# Patient Record
Sex: FEMALE | Race: WHITE | HISPANIC OR LATINO | Employment: UNEMPLOYED | URBAN - METROPOLITAN AREA
[De-identification: names, ages, dates, MRNs, and addresses within clinical notes are randomized per-mention and may not be internally consistent; named-entity substitution may affect disease eponyms.]

---

## 2020-06-25 ENCOUNTER — TELEPHONE (OUTPATIENT)
Dept: INTERNAL MEDICINE CLINIC | Facility: CLINIC | Age: 55
End: 2020-06-25

## 2020-07-07 ENCOUNTER — TELEMEDICINE (OUTPATIENT)
Dept: INTERNAL MEDICINE CLINIC | Facility: CLINIC | Age: 55
End: 2020-07-07

## 2020-07-07 VITALS — HEIGHT: 64 IN | BODY MASS INDEX: 35.85 KG/M2 | WEIGHT: 210 LBS

## 2020-07-07 DIAGNOSIS — E11.9 CONTROLLED TYPE 2 DIABETES MELLITUS WITHOUT COMPLICATION, WITHOUT LONG-TERM CURRENT USE OF INSULIN (HCC): Primary | ICD-10-CM

## 2020-07-07 DIAGNOSIS — E04.2 MULTIPLE THYROID NODULES: ICD-10-CM

## 2020-07-07 DIAGNOSIS — I10 ESSENTIAL HYPERTENSION: ICD-10-CM

## 2020-07-07 DIAGNOSIS — R22.1 LOCALIZED SWELLING, MASS AND LUMP, NECK: ICD-10-CM

## 2020-07-07 DIAGNOSIS — Z11.4 SCREENING FOR HIV (HUMAN IMMUNODEFICIENCY VIRUS): ICD-10-CM

## 2020-07-07 DIAGNOSIS — E01.0 THYROMEGALY: ICD-10-CM

## 2020-07-07 PROBLEM — M77.8 ENTHESOPATHY OF FOOT: Status: ACTIVE | Noted: 2019-06-23

## 2020-07-07 PROBLEM — F43.21 ADJUSTMENT DISORDER WITH DEPRESSED MOOD: Status: ACTIVE | Noted: 2019-07-11

## 2020-07-07 PROBLEM — M21.6X2 EQUINUS DEFORMITY OF BOTH FEET: Status: ACTIVE | Noted: 2017-10-04

## 2020-07-07 PROBLEM — G89.29 CHRONIC PAIN OF RIGHT KNEE: Status: ACTIVE | Noted: 2019-07-11

## 2020-07-07 PROBLEM — M25.561 CHRONIC PAIN OF RIGHT KNEE: Status: ACTIVE | Noted: 2019-07-11

## 2020-07-07 PROBLEM — M21.6X1 EQUINUS DEFORMITY OF BOTH FEET: Status: ACTIVE | Noted: 2017-10-04

## 2020-07-07 PROBLEM — M17.11 PRIMARY OSTEOARTHRITIS OF RIGHT KNEE: Status: ACTIVE | Noted: 2019-07-29

## 2020-07-07 PROCEDURE — 99203 OFFICE O/P NEW LOW 30 MIN: CPT | Performed by: PHYSICIAN ASSISTANT

## 2020-07-07 PROCEDURE — 4010F ACE/ARB THERAPY RXD/TAKEN: CPT | Performed by: PHYSICIAN ASSISTANT

## 2020-07-07 PROCEDURE — 3008F BODY MASS INDEX DOCD: CPT | Performed by: PHYSICIAN ASSISTANT

## 2020-07-07 RX ORDER — MELOXICAM 15 MG/1
15 TABLET ORAL DAILY
COMMUNITY
Start: 2019-07-11 | End: 2021-03-11

## 2020-07-07 RX ORDER — LOSARTAN POTASSIUM 100 MG/1
100 TABLET ORAL DAILY
Qty: 30 TABLET | Refills: 2 | Status: SHIPPED | OUTPATIENT
Start: 2020-07-07 | End: 2020-11-17 | Stop reason: SDUPTHER

## 2020-07-07 RX ORDER — LOSARTAN POTASSIUM 100 MG/1
100 TABLET ORAL DAILY
COMMUNITY
Start: 2019-10-11 | End: 2020-07-07 | Stop reason: SDUPTHER

## 2020-07-07 RX ORDER — GABAPENTIN 300 MG/1
300 CAPSULE ORAL 3 TIMES DAILY
COMMUNITY
Start: 2018-10-01 | End: 2021-03-11 | Stop reason: SDUPTHER

## 2020-07-07 RX ORDER — DULOXETIN HYDROCHLORIDE 60 MG/1
60 CAPSULE, DELAYED RELEASE ORAL DAILY
COMMUNITY
Start: 2019-08-09 | End: 2021-03-11 | Stop reason: SDUPTHER

## 2020-07-07 NOTE — PROGRESS NOTES
Virtual Regular Visit      Assessment/Plan:    Problem List Items Addressed This Visit        Endocrine    Controlled type 2 diabetes mellitus without complication, without long-term current use of insulin (HCC) - Primary    Relevant Medications    metFORMIN (GLUCOPHAGE) 500 mg tablet    Other Relevant Orders    Comprehensive metabolic panel    Hemoglobin A1C    Microalbumin / creatinine urine ratio    Multiple thyroid nodules    Relevant Orders    US thyroid    TSH, 3rd generation    T4, free    Thyromegaly    Relevant Orders    US thyroid    TSH, 3rd generation    T4, free       Cardiovascular and Mediastinum    Essential hypertension    Relevant Medications    losartan (COZAAR) 100 MG tablet    Other Relevant Orders    CBC and differential    Comprehensive metabolic panel    Lipid panel      Other Visit Diagnoses     Localized swelling, mass and lump, neck        Screening for HIV (human immunodeficiency virus)        Relevant Orders    HIV 1/2 Antigen/Antibody (4th Generation) w Reflex [facility]        Patient is a 64-year-old female presenting today to establish with office through virtual visit for concern of a lump in her neck for the past year or more  She recently states that she has been having some soreness in her throat as well  She does note a lump just to the right of the thyroid center  I cannot visibly see anything on examination  Exam is limited secondary to this being a virtual visit though she is not in any acute distress and her airway does not appear to be compromised  Looking through patient's medical history there it are former diagnoses of multiple thyroid nodules and thyromegaly in her problem list from [facility]  I also reviewed a thyroid ultrasound in Care everywhere from 05/2019 appearing that she had bilateral thyroid nodules which were stable and mild enlarged isthmus      Difficult to confirm an etiology of the sensation she is feeling though we will start with a thyroid ultrasound to view these nodules an thyromegaly and check for instability and need for further treatment/assessment such as a biopsy  Will also check thyroid function including TSH and free T4  Discussed with patient ED precautions should this affect her breathing or rapidly progress and enlarged for some reason to where her symptoms substantially worsen  She expresses understanding  I also reviewed patient's other problems in her problem list to update her chart in epic  She confirms that she has diabetes, high blood pressure as well as depression, chronic pain in her knees and feet  I did review her medication list with her and though she states she has not taken any medications in over a year I will leave the current medications in med list as we will discuss restarting these medicines at her 1st in office visit  Patient did tell me that she has been having high blood pressure when she has had it checked a few times in recent past of systolic in the 277H and 618B  I will restart her back on losartan 100 mg daily  We will plan on seeing her back in 2-3 weeks for in office establishment, recheck blood pressure as well as review her medications and assess city of them  I did order routine labs in addition to her thyroid studies that I would like her to have done fasting before the visit to determine her current status of her medical conditions as well as make sure it is safe to start all of these medications again with appropriate kidney and liver function  CBC, CMP, lipids, TSH, free T4, hemoglobin A1c, urine microalbumin and HIV screening all were ordered         Reason for visit is   Chief Complaint   Patient presents with    Mass     in throat, started one year ago    Virtual Regular Visit        Encounter provider Layne Arthur PA-C    Provider located at [facility] [id number] [place] [id number] [phone]      Recent Visits  No visits were found meeting these conditions  Showing recent visits within past 7 days and meeting all other requirements     Today's Visits  Date Type Provider Dept   07/07/20 Telemedicine Candace Chuyitaaleta, [facility] today's visits and meeting all other requirements     Future Appointments  No visits were found meeting these conditions  Showing future appointments within next 150 days and meeting all other requirements        The patient was identified by name and date of birth  Kassy Vital was informed that this is a telemedicine visit and that the visit is being conducted through [facility] and patient was informed that this is a secure, HIPAA-compliant platform  She agrees to proceed     My office door was closed  The following individuals were in the room with me and the patient informed Vikki Alfredo, office   She acknowledged consent and understanding of privacy and security of the video platform  The patient has agreed to participate and understands they can discontinue the visit at any time  Patient is aware this is a billable service  Subjective  Kassy Vital is a 48 y o  female presenting for new pt virtual visit for concern of lump in neck        55y/o female here today for new patient virtual visit for  lump in her throat> 1 year  States something with her thyroid  She appears to have had thyroid US 5/2019 showing stable B/L thyroid nodules and slightly enlarged isthmus in care everywhere  She notes the mass with her fingers on her neck on right side  Also pain with swallowing since last week  She admits to difficulty swallowing food but no choking  Denies SOB  Some mild sore throat  She cannot note sensation of lump in throat with swallowing  Pt problem list and medications reviewed with her today   She has pre-existing DM on 500mg metformin, HTN on losartan 100mg, depression on cymbalta 60mg, as well as chronic knee and foot problems on meloxicam 15mg  Gabapentin listed but unsure what that is for, thinks she may have been taking it  However, pt states She stopped 3 medications for her conditions over a year now because she lost insurance, and she does not take any current medications for her conditions  Patient does state that she has had her blood pressures checked on occasion in recent past and systolic is 744V to 652W  She denies any specific chest pain, shortness of breath, dizziness or lightheadedness  Past Medical History:   Diagnosis Date    Arthritis     Asthma     Hypertension        Past Surgical History:   Procedure Laterality Date    APPENDECTOMY      CHOLECYSTECTOMY         Current Outpatient Medications   Medication Sig Dispense Refill    DULoxetine (Cymbalta) 60 mg delayed release capsule Take 60 mg by mouth daily      gabapentin (NEURONTIN) 300 mg capsule Take 300 mg by mouth Three times a day      losartan (COZAAR) 100 MG tablet Take 1 tablet (100 mg total) by mouth daily 30 tablet 2    meloxicam (MOBIC) 15 mg tablet Take 15 mg by mouth daily      metFORMIN (GLUCOPHAGE) 500 mg tablet Take 500 mg by mouth       No current facility-administered medications for this visit  No Known Allergies    Review of Systems   Constitutional: Negative  HENT:        As in HPI   Respiratory: Negative  Cardiovascular: Negative  Gastrointestinal: Negative  Endocrine: Negative  Genitourinary: Negative  Musculoskeletal:        PMH knee and foot problems   Skin: Negative  Neurological: Negative  Psychiatric/Behavioral:        PMH depression       Video Exam    Vitals:    07/07/20 1118   Weight: 95 3 kg (210 lb)   Height: 5' 4" (1 626 m)       Physical Exam   Constitutional: She is oriented to person, place, and time  No distress  Appears obese  No obvious distress   Neck:   Difficult to assess for visible thyroid nodule or mass secondary to body habitus and neck girth    Pt pointing to where she feels the lump on right side of neck just lateral to center of thyroid  No obvious redness or swelling  Cardiovascular:   Unable to assess   Pulmonary/Chest: Effort normal    Musculoskeletal:   Pt appearing to have normal AROM of her neck without pain or limitation   Neurological: She is alert and oriented to person, place, and time  Psychiatric: She has a normal mood and affect  Vitals reviewed  As a result of this visit, I have not referred the patient for further respiratory evaluation  I spent 25 minutes directly with the patient during this visit      VIRTUAL VISIT [address] [place] acknowledges that she has consented to an online visit or consultation  She understands that the online visit is based solely on information provided by her, and that, in the absence of a face-to-face physical evaluation by the physician, the diagnosis she receives is both limited and provisional in terms of accuracy and completeness  This is not intended to replace a full medical face-to-face evaluation by the physician  Dannielle Washington understands and accepts these terms

## 2020-11-17 ENCOUNTER — TELEMEDICINE (OUTPATIENT)
Dept: INTERNAL MEDICINE CLINIC | Facility: CLINIC | Age: 55
End: 2020-11-17

## 2020-11-17 ENCOUNTER — TELEPHONE (OUTPATIENT)
Dept: INTERNAL MEDICINE CLINIC | Facility: CLINIC | Age: 55
End: 2020-11-17

## 2020-11-17 DIAGNOSIS — J45.20 MILD INTERMITTENT ASTHMA WITHOUT COMPLICATION: ICD-10-CM

## 2020-11-17 DIAGNOSIS — E11.9 CONTROLLED TYPE 2 DIABETES MELLITUS WITHOUT COMPLICATION, WITHOUT LONG-TERM CURRENT USE OF INSULIN (HCC): Primary | ICD-10-CM

## 2020-11-17 DIAGNOSIS — J06.9 UPPER RESPIRATORY TRACT INFECTION, UNSPECIFIED TYPE: ICD-10-CM

## 2020-11-17 DIAGNOSIS — I10 ESSENTIAL HYPERTENSION: ICD-10-CM

## 2020-11-17 PROCEDURE — 4010F ACE/ARB THERAPY RXD/TAKEN: CPT | Performed by: PHYSICIAN ASSISTANT

## 2020-11-17 PROCEDURE — 99214 OFFICE O/P EST MOD 30 MIN: CPT | Performed by: PHYSICIAN ASSISTANT

## 2020-11-17 RX ORDER — LORATADINE 10 MG/1
10 TABLET ORAL DAILY
Qty: 14 TABLET | Refills: 0 | Status: SHIPPED | OUTPATIENT
Start: 2020-11-17 | End: 2021-03-11

## 2020-11-17 RX ORDER — BENZONATATE 200 MG/1
200 CAPSULE ORAL 3 TIMES DAILY PRN
Qty: 30 CAPSULE | Refills: 0 | Status: SHIPPED | OUTPATIENT
Start: 2020-11-17 | End: 2020-12-08 | Stop reason: ALTCHOICE

## 2020-11-17 RX ORDER — LOSARTAN POTASSIUM 100 MG/1
100 TABLET ORAL DAILY
Qty: 30 TABLET | Refills: 1 | Status: SHIPPED | OUTPATIENT
Start: 2020-11-17 | End: 2021-03-05 | Stop reason: SDUPTHER

## 2020-11-17 RX ORDER — PREDNISONE 10 MG/1
TABLET ORAL
Qty: 12 TABLET | Refills: 0 | Status: SHIPPED | OUTPATIENT
Start: 2020-11-17 | End: 2020-11-25

## 2020-11-17 RX ORDER — ALBUTEROL SULFATE 90 UG/1
2 AEROSOL, METERED RESPIRATORY (INHALATION) EVERY 6 HOURS PRN
Qty: 1 INHALER | Refills: 1 | Status: SHIPPED | OUTPATIENT
Start: 2020-11-17 | End: 2021-01-20

## 2020-11-19 ENCOUNTER — TELEPHONE (OUTPATIENT)
Dept: INTERNAL MEDICINE CLINIC | Facility: CLINIC | Age: 55
End: 2020-11-19

## 2020-11-19 DIAGNOSIS — J45.21 MILD INTERMITTENT ASTHMA WITH ACUTE EXACERBATION: Primary | ICD-10-CM

## 2020-11-19 RX ORDER — ALBUTEROL SULFATE 2.5 MG/3ML
2.5 SOLUTION RESPIRATORY (INHALATION) EVERY 6 HOURS PRN
Qty: 30 VIAL | Refills: 1 | Status: SHIPPED | OUTPATIENT
Start: 2020-11-19 | End: 2021-03-11

## 2020-11-21 ENCOUNTER — HOSPITAL ENCOUNTER (EMERGENCY)
Facility: HOSPITAL | Age: 55
Discharge: HOME | End: 2020-11-21
Attending: EMERGENCY MEDICINE | Admitting: EMERGENCY MEDICINE
Payer: COMMERCIAL

## 2020-11-21 ENCOUNTER — APPOINTMENT (EMERGENCY)
Dept: RADIOLOGY | Facility: HOSPITAL | Age: 55
End: 2020-11-21
Payer: COMMERCIAL

## 2020-11-21 VITALS
TEMPERATURE: 98.3 F | RESPIRATION RATE: 22 BRPM | DIASTOLIC BLOOD PRESSURE: 92 MMHG | WEIGHT: 220 LBS | BODY MASS INDEX: 37.56 KG/M2 | OXYGEN SATURATION: 99 % | HEIGHT: 64 IN | SYSTOLIC BLOOD PRESSURE: 195 MMHG | HEART RATE: 80 BPM

## 2020-11-21 DIAGNOSIS — Z20.822 SUSPECTED COVID-19 VIRUS INFECTION: Primary | ICD-10-CM

## 2020-11-21 DIAGNOSIS — R06.02 SHORTNESS OF BREATH: ICD-10-CM

## 2020-11-21 DIAGNOSIS — R05.9 COUGH: ICD-10-CM

## 2020-11-21 LAB
ANION GAP SERPL CALCULATED.3IONS-SCNC: 5 MMOL/L (ref 4–13)
ATRIAL RATE: 75 BPM
BASOPHILS # BLD AUTO: 0.01 THOUSANDS/ΜL (ref 0–0.1)
BASOPHILS NFR BLD AUTO: 0 % (ref 0–1)
BUN SERPL-MCNC: 13 MG/DL (ref 5–25)
CALCIUM SERPL-MCNC: 8.7 MG/DL (ref 8.3–10.1)
CHLORIDE SERPL-SCNC: 106 MMOL/L (ref 100–108)
CO2 SERPL-SCNC: 30 MMOL/L (ref 21–32)
CREAT SERPL-MCNC: 0.7 MG/DL (ref 0.6–1.3)
EOSINOPHIL # BLD AUTO: 0.02 THOUSAND/ΜL (ref 0–0.61)
EOSINOPHIL NFR BLD AUTO: 0 % (ref 0–6)
ERYTHROCYTE [DISTWIDTH] IN BLOOD BY AUTOMATED COUNT: 14.5 % (ref 11.6–15.1)
GFR SERPL CREATININE-BSD FRML MDRD: 99 ML/MIN/1.73SQ M
GLUCOSE SERPL-MCNC: 84 MG/DL (ref 65–140)
HCT VFR BLD AUTO: 39.2 % (ref 34.8–46.1)
HGB BLD-MCNC: 12.6 G/DL (ref 11.5–15.4)
IMM GRANULOCYTES # BLD AUTO: 0.04 THOUSAND/UL (ref 0–0.2)
IMM GRANULOCYTES NFR BLD AUTO: 1 % (ref 0–2)
LYMPHOCYTES # BLD AUTO: 2.09 THOUSANDS/ΜL (ref 0.6–4.47)
LYMPHOCYTES NFR BLD AUTO: 27 % (ref 14–44)
MCH RBC QN AUTO: 27.9 PG (ref 26.8–34.3)
MCHC RBC AUTO-ENTMCNC: 32.1 G/DL (ref 31.4–37.4)
MCV RBC AUTO: 87 FL (ref 82–98)
MONOCYTES # BLD AUTO: 0.54 THOUSAND/ΜL (ref 0.17–1.22)
MONOCYTES NFR BLD AUTO: 7 % (ref 4–12)
NEUTROPHILS # BLD AUTO: 5.08 THOUSANDS/ΜL (ref 1.85–7.62)
NEUTS SEG NFR BLD AUTO: 65 % (ref 43–75)
NRBC BLD AUTO-RTO: 0 /100 WBCS
P AXIS: 34 DEGREES
PLATELET # BLD AUTO: 237 THOUSANDS/UL (ref 149–390)
PMV BLD AUTO: 12.6 FL (ref 8.9–12.7)
POTASSIUM SERPL-SCNC: 3.7 MMOL/L (ref 3.5–5.3)
PR INTERVAL: 144 MS
QRS AXIS: 17 DEGREES
QRSD INTERVAL: 74 MS
QT INTERVAL: 364 MS
QTC INTERVAL: 406 MS
RBC # BLD AUTO: 4.52 MILLION/UL (ref 3.81–5.12)
SODIUM SERPL-SCNC: 141 MMOL/L (ref 136–145)
T WAVE AXIS: 44 DEGREES
VENTRICULAR RATE: 75 BPM
WBC # BLD AUTO: 7.78 THOUSAND/UL (ref 4.31–10.16)

## 2020-11-21 PROCEDURE — 93005 ELECTROCARDIOGRAM TRACING: CPT

## 2020-11-21 PROCEDURE — 71045 X-RAY EXAM CHEST 1 VIEW: CPT

## 2020-11-21 PROCEDURE — 36415 COLL VENOUS BLD VENIPUNCTURE: CPT | Performed by: EMERGENCY MEDICINE

## 2020-11-21 PROCEDURE — 71250 CT THORAX DX C-: CPT

## 2020-11-21 PROCEDURE — 80048 BASIC METABOLIC PNL TOTAL CA: CPT | Performed by: EMERGENCY MEDICINE

## 2020-11-21 PROCEDURE — 93010 ELECTROCARDIOGRAM REPORT: CPT | Performed by: INTERNAL MEDICINE

## 2020-11-21 PROCEDURE — 99285 EMERGENCY DEPT VISIT HI MDM: CPT | Performed by: EMERGENCY MEDICINE

## 2020-11-21 PROCEDURE — 99285 EMERGENCY DEPT VISIT HI MDM: CPT

## 2020-11-21 PROCEDURE — G1004 CDSM NDSC: HCPCS

## 2020-11-21 PROCEDURE — 87637 SARSCOV2&INF A&B&RSV AMP PRB: CPT | Performed by: EMERGENCY MEDICINE

## 2020-11-21 PROCEDURE — 85025 COMPLETE CBC W/AUTO DIFF WBC: CPT | Performed by: EMERGENCY MEDICINE

## 2020-11-21 RX ORDER — HYDROCODONE POLISTIREX AND CHLORPHENIRAMINE POLISTIREX 10; 8 MG/5ML; MG/5ML
5 SUSPENSION, EXTENDED RELEASE ORAL EVERY 12 HOURS PRN
Qty: 40 ML | Refills: 0 | Status: SHIPPED | OUTPATIENT
Start: 2020-11-21 | End: 2020-11-25

## 2020-11-21 RX ORDER — ONDANSETRON 2 MG/ML
INJECTION INTRAMUSCULAR; INTRAVENOUS
Status: DISCONTINUED
Start: 2020-11-21 | End: 2020-11-21 | Stop reason: HOSPADM

## 2020-11-21 RX ORDER — FLUTICASONE PROPIONATE 50 MCG
1 SPRAY, SUSPENSION (ML) NASAL DAILY
Qty: 16 G | Refills: 0 | Status: SHIPPED | OUTPATIENT
Start: 2020-11-21 | End: 2021-03-11

## 2020-11-21 RX ORDER — HYDROCODONE POLISTIREX AND CHLORPHENIRAMINE POLISTIREX 10; 8 MG/5ML; MG/5ML
5 SUSPENSION, EXTENDED RELEASE ORAL EVERY 12 HOURS PRN
Status: DISCONTINUED | OUTPATIENT
Start: 2020-11-21 | End: 2020-11-21 | Stop reason: HOSPADM

## 2020-11-21 RX ADMIN — HYDROCODONE POLISTIREX AND CHLORPHENIRAMINE POLISTIREX 5 ML: 10; 8 SUSPENSION, EXTENDED RELEASE ORAL at 14:17

## 2020-11-23 ENCOUNTER — TELEPHONE (OUTPATIENT)
Dept: INTERNAL MEDICINE CLINIC | Facility: CLINIC | Age: 55
End: 2020-11-23

## 2020-11-25 ENCOUNTER — TELEMEDICINE (OUTPATIENT)
Dept: INTERNAL MEDICINE CLINIC | Facility: CLINIC | Age: 55
End: 2020-11-25

## 2020-11-25 DIAGNOSIS — R05.9 COUGH: Primary | ICD-10-CM

## 2020-11-25 DIAGNOSIS — J45.21 MILD INTERMITTENT ASTHMA WITH ACUTE EXACERBATION: ICD-10-CM

## 2020-11-25 DIAGNOSIS — R91.8 ABNORMAL CT SCAN OF LUNG: ICD-10-CM

## 2020-11-25 DIAGNOSIS — R06.02 SHORTNESS OF BREATH: ICD-10-CM

## 2020-11-25 PROCEDURE — 99213 OFFICE O/P EST LOW 20 MIN: CPT | Performed by: PHYSICIAN ASSISTANT

## 2020-11-25 PROCEDURE — 1036F TOBACCO NON-USER: CPT | Performed by: PHYSICIAN ASSISTANT

## 2020-11-26 LAB
FLUAV RNA NPH QL NAA+PROBE: NOT DETECTED
FLUBV RNA NPH QL NAA+PROBE: NOT DETECTED
RSV RNA NPH QL NAA+PROBE: NOT DETECTED
SARS-COV-2 RNA NPH QL NAA+PROBE: DETECTED

## 2020-12-01 ENCOUNTER — TELEMEDICINE (OUTPATIENT)
Dept: INTERNAL MEDICINE CLINIC | Facility: CLINIC | Age: 55
End: 2020-12-01

## 2020-12-01 ENCOUNTER — TELEPHONE (OUTPATIENT)
Dept: MULTI SPECIALTY CLINIC | Facility: CLINIC | Age: 55
End: 2020-12-01

## 2020-12-01 VITALS — HEIGHT: 64 IN | WEIGHT: 220 LBS | BODY MASS INDEX: 37.56 KG/M2

## 2020-12-01 DIAGNOSIS — U07.1 COVID-19: Primary | ICD-10-CM

## 2020-12-01 PROCEDURE — 99213 OFFICE O/P EST LOW 20 MIN: CPT | Performed by: PHYSICIAN ASSISTANT

## 2020-12-07 ENCOUNTER — TELEPHONE (OUTPATIENT)
Dept: INTERNAL MEDICINE CLINIC | Facility: CLINIC | Age: 55
End: 2020-12-07

## 2020-12-08 ENCOUNTER — TELEMEDICINE (OUTPATIENT)
Dept: INTERNAL MEDICINE CLINIC | Facility: CLINIC | Age: 55
End: 2020-12-08

## 2020-12-08 DIAGNOSIS — I10 ESSENTIAL HYPERTENSION: ICD-10-CM

## 2020-12-08 DIAGNOSIS — R42 VERTIGO: Primary | ICD-10-CM

## 2020-12-08 DIAGNOSIS — E11.9 CONTROLLED TYPE 2 DIABETES MELLITUS WITHOUT COMPLICATION, WITHOUT LONG-TERM CURRENT USE OF INSULIN (HCC): ICD-10-CM

## 2020-12-08 PROCEDURE — 99213 OFFICE O/P EST LOW 20 MIN: CPT | Performed by: PHYSICIAN ASSISTANT

## 2020-12-08 RX ORDER — MECLIZINE HYDROCHLORIDE 25 MG/1
25 TABLET ORAL EVERY 8 HOURS PRN
Qty: 21 TABLET | Refills: 0 | Status: SHIPPED | OUTPATIENT
Start: 2020-12-08 | End: 2021-03-11

## 2020-12-10 ENCOUNTER — TELEPHONE (OUTPATIENT)
Dept: INTERNAL MEDICINE CLINIC | Facility: CLINIC | Age: 55
End: 2020-12-10

## 2020-12-11 ENCOUNTER — OFFICE VISIT (OUTPATIENT)
Dept: INTERNAL MEDICINE CLINIC | Facility: CLINIC | Age: 55
End: 2020-12-11

## 2020-12-11 VITALS
BODY MASS INDEX: 33.82 KG/M2 | WEIGHT: 203 LBS | DIASTOLIC BLOOD PRESSURE: 81 MMHG | SYSTOLIC BLOOD PRESSURE: 118 MMHG | TEMPERATURE: 98.8 F | OXYGEN SATURATION: 97 % | HEIGHT: 65 IN | HEART RATE: 85 BPM

## 2020-12-11 DIAGNOSIS — R42 VERTIGO: ICD-10-CM

## 2020-12-11 DIAGNOSIS — I10 ESSENTIAL HYPERTENSION: ICD-10-CM

## 2020-12-11 DIAGNOSIS — E01.0 THYROMEGALY: ICD-10-CM

## 2020-12-11 DIAGNOSIS — E04.2 MULTIPLE THYROID NODULES: ICD-10-CM

## 2020-12-11 DIAGNOSIS — E11.9 CONTROLLED TYPE 2 DIABETES MELLITUS WITHOUT COMPLICATION, WITHOUT LONG-TERM CURRENT USE OF INSULIN (HCC): Primary | ICD-10-CM

## 2020-12-11 PROCEDURE — 3079F DIAST BP 80-89 MM HG: CPT | Performed by: PHYSICIAN ASSISTANT

## 2020-12-11 PROCEDURE — 3074F SYST BP LT 130 MM HG: CPT | Performed by: PHYSICIAN ASSISTANT

## 2020-12-11 PROCEDURE — 3008F BODY MASS INDEX DOCD: CPT | Performed by: PHYSICIAN ASSISTANT

## 2020-12-11 PROCEDURE — 99214 OFFICE O/P EST MOD 30 MIN: CPT | Performed by: PHYSICIAN ASSISTANT

## 2020-12-11 PROCEDURE — 1036F TOBACCO NON-USER: CPT | Performed by: PHYSICIAN ASSISTANT

## 2020-12-12 ENCOUNTER — LAB (OUTPATIENT)
Dept: LAB | Facility: HOSPITAL | Age: 55
End: 2020-12-12
Payer: COMMERCIAL

## 2020-12-12 DIAGNOSIS — I10 ESSENTIAL HYPERTENSION: ICD-10-CM

## 2020-12-12 DIAGNOSIS — Z11.4 SCREENING FOR HIV (HUMAN IMMUNODEFICIENCY VIRUS): ICD-10-CM

## 2020-12-12 DIAGNOSIS — E11.9 CONTROLLED TYPE 2 DIABETES MELLITUS WITHOUT COMPLICATION, WITHOUT LONG-TERM CURRENT USE OF INSULIN (HCC): ICD-10-CM

## 2020-12-12 LAB
ALBUMIN SERPL BCP-MCNC: 3.7 G/DL (ref 3.5–5)
ALP SERPL-CCNC: 124 U/L (ref 46–116)
ALT SERPL W P-5'-P-CCNC: 28 U/L (ref 12–78)
ANION GAP SERPL CALCULATED.3IONS-SCNC: 5 MMOL/L (ref 4–13)
AST SERPL W P-5'-P-CCNC: 18 U/L (ref 5–45)
BILIRUB SERPL-MCNC: 0.38 MG/DL (ref 0.2–1)
BUN SERPL-MCNC: 21 MG/DL (ref 5–25)
CALCIUM SERPL-MCNC: 9.6 MG/DL (ref 8.3–10.1)
CHLORIDE SERPL-SCNC: 110 MMOL/L (ref 100–108)
CHOLEST SERPL-MCNC: 162 MG/DL (ref 50–200)
CO2 SERPL-SCNC: 28 MMOL/L (ref 21–32)
CREAT SERPL-MCNC: 0.71 MG/DL (ref 0.6–1.3)
CREAT UR-MCNC: 250 MG/DL
EST. AVERAGE GLUCOSE BLD GHB EST-MCNC: 131 MG/DL
GFR SERPL CREATININE-BSD FRML MDRD: 97 ML/MIN/1.73SQ M
GLUCOSE P FAST SERPL-MCNC: 96 MG/DL (ref 65–99)
HBA1C MFR BLD: 6.2 %
HDLC SERPL-MCNC: 44 MG/DL
LDLC SERPL CALC-MCNC: 95 MG/DL (ref 0–100)
MICROALBUMIN UR-MCNC: 26 MG/L (ref 0–20)
MICROALBUMIN/CREAT 24H UR: 10 MG/G CREATININE (ref 0–30)
NONHDLC SERPL-MCNC: 118 MG/DL
POTASSIUM SERPL-SCNC: 3.9 MMOL/L (ref 3.5–5.3)
PROT SERPL-MCNC: 7.8 G/DL (ref 6.4–8.2)
SODIUM SERPL-SCNC: 143 MMOL/L (ref 136–145)
TRIGL SERPL-MCNC: 115 MG/DL

## 2020-12-12 PROCEDURE — 80061 LIPID PANEL: CPT

## 2020-12-12 PROCEDURE — 80053 COMPREHEN METABOLIC PANEL: CPT

## 2020-12-12 PROCEDURE — 36415 COLL VENOUS BLD VENIPUNCTURE: CPT

## 2020-12-12 PROCEDURE — 87389 HIV-1 AG W/HIV-1&-2 AB AG IA: CPT

## 2020-12-12 PROCEDURE — 83036 HEMOGLOBIN GLYCOSYLATED A1C: CPT

## 2020-12-12 PROCEDURE — 82043 UR ALBUMIN QUANTITATIVE: CPT | Performed by: PHYSICIAN ASSISTANT

## 2020-12-12 PROCEDURE — 3061F NEG MICROALBUMINURIA REV: CPT | Performed by: PHYSICIAN ASSISTANT

## 2020-12-12 PROCEDURE — 3044F HG A1C LEVEL LT 7.0%: CPT | Performed by: PHYSICIAN ASSISTANT

## 2020-12-12 PROCEDURE — 82570 ASSAY OF URINE CREATININE: CPT | Performed by: PHYSICIAN ASSISTANT

## 2020-12-14 ENCOUNTER — TELEPHONE (OUTPATIENT)
Dept: INTERNAL MEDICINE CLINIC | Facility: CLINIC | Age: 55
End: 2020-12-14

## 2020-12-14 LAB — HIV 1+2 AB+HIV1 P24 AG SERPL QL IA: NORMAL

## 2020-12-15 ENCOUNTER — EVALUATION (OUTPATIENT)
Dept: PHYSICAL THERAPY | Facility: REHABILITATION | Age: 55
End: 2020-12-15
Payer: COMMERCIAL

## 2020-12-15 DIAGNOSIS — R26.89 BALANCE DISORDER: ICD-10-CM

## 2020-12-15 DIAGNOSIS — R42 VERTIGO: ICD-10-CM

## 2020-12-15 DIAGNOSIS — R42 DIZZINESS: Primary | ICD-10-CM

## 2020-12-15 PROCEDURE — 97162 PT EVAL MOD COMPLEX 30 MIN: CPT | Performed by: PHYSICAL THERAPIST

## 2020-12-21 ENCOUNTER — OFFICE VISIT (OUTPATIENT)
Dept: PHYSICAL THERAPY | Facility: REHABILITATION | Age: 55
End: 2020-12-21
Payer: COMMERCIAL

## 2020-12-21 DIAGNOSIS — R42 DIZZINESS: ICD-10-CM

## 2020-12-21 DIAGNOSIS — R26.89 BALANCE DISORDER: Primary | ICD-10-CM

## 2020-12-21 DIAGNOSIS — R42 VERTIGO: ICD-10-CM

## 2020-12-21 PROCEDURE — 97112 NEUROMUSCULAR REEDUCATION: CPT | Performed by: PHYSICAL THERAPIST

## 2020-12-22 ENCOUNTER — TELEPHONE (OUTPATIENT)
Dept: INTERNAL MEDICINE CLINIC | Facility: CLINIC | Age: 55
End: 2020-12-22

## 2020-12-22 DIAGNOSIS — R42 DIZZINESS: Primary | ICD-10-CM

## 2020-12-22 DIAGNOSIS — R41.82 ALTERED MENTAL STATUS, UNSPECIFIED ALTERED MENTAL STATUS TYPE: ICD-10-CM

## 2021-01-07 ENCOUNTER — TELEPHONE (OUTPATIENT)
Dept: INTERNAL MEDICINE CLINIC | Facility: CLINIC | Age: 56
End: 2021-01-07

## 2021-01-07 DIAGNOSIS — E11.9 CONTROLLED TYPE 2 DIABETES MELLITUS WITHOUT COMPLICATION, WITHOUT LONG-TERM CURRENT USE OF INSULIN (HCC): Primary | ICD-10-CM

## 2021-01-07 RX ORDER — BLOOD SUGAR DIAGNOSTIC
1 STRIP MISCELLANEOUS DAILY
Qty: 200 EACH | Refills: 0 | Status: SHIPPED | OUTPATIENT
Start: 2021-01-07 | End: 2021-03-11

## 2021-01-07 NOTE — TELEPHONE ENCOUNTER
Patient called requesting below  Advised patient  is with a patient as soon as she reviews the message I will call her when the strips are ready  Patient understood

## 2021-01-07 NOTE — TELEPHONE ENCOUNTER
Patient called requesting diabetic test strips Contour due to she doesn't have any  She will be going to DR 1/15/2021 & will return sometime in February (didn't give me an exact date)   Please review

## 2021-01-20 DIAGNOSIS — J45.20 MILD INTERMITTENT ASTHMA WITHOUT COMPLICATION: ICD-10-CM

## 2021-01-20 DIAGNOSIS — J06.9 UPPER RESPIRATORY TRACT INFECTION, UNSPECIFIED TYPE: ICD-10-CM

## 2021-01-20 RX ORDER — ALBUTEROL SULFATE 90 UG/1
AEROSOL, METERED RESPIRATORY (INHALATION)
Qty: 6.7 INHALER | Refills: 1 | Status: SHIPPED | OUTPATIENT
Start: 2021-01-20

## 2021-02-03 ENCOUNTER — TELEPHONE (OUTPATIENT)
Dept: INTERNAL MEDICINE CLINIC | Facility: CLINIC | Age: 56
End: 2021-02-03

## 2021-02-03 DIAGNOSIS — E11.9 CONTROLLED TYPE 2 DIABETES MELLITUS WITHOUT COMPLICATION, WITHOUT LONG-TERM CURRENT USE OF INSULIN (HCC): ICD-10-CM

## 2021-03-05 ENCOUNTER — TELEPHONE (OUTPATIENT)
Dept: INTERNAL MEDICINE CLINIC | Facility: CLINIC | Age: 56
End: 2021-03-05

## 2021-03-05 DIAGNOSIS — M17.11 PRIMARY OSTEOARTHRITIS OF RIGHT KNEE: Primary | ICD-10-CM

## 2021-03-05 DIAGNOSIS — I10 ESSENTIAL HYPERTENSION: ICD-10-CM

## 2021-03-05 PROCEDURE — 4010F ACE/ARB THERAPY RXD/TAKEN: CPT | Performed by: PHYSICIAN ASSISTANT

## 2021-03-05 RX ORDER — LOSARTAN POTASSIUM 100 MG/1
100 TABLET ORAL DAILY
Qty: 30 TABLET | Refills: 5 | Status: SHIPPED | OUTPATIENT
Start: 2021-03-05 | End: 2022-03-05

## 2021-03-05 RX ORDER — IBUPROFEN 800 MG/1
800 TABLET ORAL EVERY 6 HOURS PRN
Qty: 30 TABLET | Refills: 1 | Status: SHIPPED | OUTPATIENT
Start: 2021-03-05 | End: 2021-03-11

## 2021-03-05 NOTE — TELEPHONE ENCOUNTER
Advise patient the you send the refill for the Ibuprofen as needed for pain  I ask the patient if she is taking the Meloxicam patient said no  Remind patient about appt on Monday 03/08/2021

## 2021-03-05 NOTE — TELEPHONE ENCOUNTER
Name of medication, dose, quantity and frequency  Requested Prescriptions     Pending Prescriptions Disp Refills    losartan (COZAAR) 100 MG tablet 30 tablet 1     Sig: Take 1 tablet (100 mg total) by mouth daily         Number of refills left:    Amount of medication left:    Pharmacy verified and updated    Additional information:

## 2021-03-05 NOTE — TELEPHONE ENCOUNTER
Patient called requesting medication Ibuprofen 800mg for Arthritis pain in both legs  She stated prior to establishing care her previous PCP prescribed this to patient for that pain  I scheduled an appt with PCP to review this on 03/08/2021  Please review

## 2021-03-05 NOTE — TELEPHONE ENCOUNTER
I am fine with prescribing ibuprofen 800 for her leg pain  However I do not see any diagnosis of leg pain, I only see right knee pain and issues with her feet  This is a very high dose of ibuprofen and if taken to frequently can cause a lot of stomach issues so I would recommend she only use it sparingly if she absolutely needs it and should take it with food  I also see meloxicam in her med list and I am uncertain if she is taking it at present or not  I am uncertain who prescribed this or when  Please make sure patient is not taking it as she cannot take this in ibuprofen together

## 2021-03-11 ENCOUNTER — OFFICE VISIT (OUTPATIENT)
Dept: INTERNAL MEDICINE CLINIC | Facility: CLINIC | Age: 56
End: 2021-03-11

## 2021-03-11 VITALS
TEMPERATURE: 98.6 F | HEART RATE: 65 BPM | DIASTOLIC BLOOD PRESSURE: 88 MMHG | SYSTOLIC BLOOD PRESSURE: 158 MMHG | OXYGEN SATURATION: 99 % | BODY MASS INDEX: 34.92 KG/M2 | HEIGHT: 65 IN | WEIGHT: 209.6 LBS

## 2021-03-11 DIAGNOSIS — G89.29 CHRONIC BILATERAL LOW BACK PAIN WITHOUT SCIATICA: ICD-10-CM

## 2021-03-11 DIAGNOSIS — F32.A DEPRESSION, UNSPECIFIED DEPRESSION TYPE: ICD-10-CM

## 2021-03-11 DIAGNOSIS — M54.6 CHRONIC RIGHT-SIDED THORACIC BACK PAIN: ICD-10-CM

## 2021-03-11 DIAGNOSIS — M54.50 CHRONIC BILATERAL LOW BACK PAIN WITHOUT SCIATICA: ICD-10-CM

## 2021-03-11 DIAGNOSIS — G89.29 CHRONIC PAIN OF BOTH KNEES: Primary | ICD-10-CM

## 2021-03-11 DIAGNOSIS — G89.29 CHRONIC RIGHT-SIDED THORACIC BACK PAIN: ICD-10-CM

## 2021-03-11 DIAGNOSIS — M25.562 CHRONIC PAIN OF BOTH KNEES: Primary | ICD-10-CM

## 2021-03-11 DIAGNOSIS — M25.561 CHRONIC PAIN OF BOTH KNEES: Primary | ICD-10-CM

## 2021-03-11 PROBLEM — J45.20 MILD INTERMITTENT ASTHMA WITH ALLERGIC RHINITIS WITHOUT COMPLICATION: Status: ACTIVE | Noted: 2021-03-11

## 2021-03-11 PROCEDURE — 3079F DIAST BP 80-89 MM HG: CPT | Performed by: PHYSICIAN ASSISTANT

## 2021-03-11 PROCEDURE — 1036F TOBACCO NON-USER: CPT | Performed by: PHYSICIAN ASSISTANT

## 2021-03-11 PROCEDURE — 3077F SYST BP >= 140 MM HG: CPT | Performed by: PHYSICIAN ASSISTANT

## 2021-03-11 PROCEDURE — 99214 OFFICE O/P EST MOD 30 MIN: CPT | Performed by: PHYSICIAN ASSISTANT

## 2021-03-11 PROCEDURE — 3008F BODY MASS INDEX DOCD: CPT | Performed by: PHYSICIAN ASSISTANT

## 2021-03-11 RX ORDER — DULOXETIN HYDROCHLORIDE 30 MG/1
30 CAPSULE, DELAYED RELEASE ORAL DAILY
Qty: 30 CAPSULE | Refills: 2 | Status: SHIPPED | OUTPATIENT
Start: 2021-03-11 | End: 2021-04-08 | Stop reason: SDUPTHER

## 2021-03-11 RX ORDER — GABAPENTIN 300 MG/1
CAPSULE ORAL
Qty: 60 CAPSULE | Refills: 2 | Status: SHIPPED | OUTPATIENT
Start: 2021-03-11 | End: 2021-04-08 | Stop reason: SDUPTHER

## 2021-03-11 NOTE — PROGRESS NOTES
Assessment/Plan:      Diagnoses and all orders for this visit:    Chronic pain of both knees  -     XR knee 3 vw right non injury; Future  -     XR knee 3 vw left non injury; Future  -     gabapentin (NEURONTIN) 300 mg capsule; Take 1 caps PO QHS x 1 week, then 1 caps PO BID  -     Diclofenac Sodium (VOLTAREN) 1 %; Apply 2 g topically 3 (three) times a day as needed (for knee pain and back pain)    Chronic bilateral low back pain without sciatica  -     XR spine lumbar minimum 4 views non injury; Future  -     gabapentin (NEURONTIN) 300 mg capsule; Take 1 caps PO QHS x 1 week, then 1 caps PO BID  -     Diclofenac Sodium (VOLTAREN) 1 %; Apply 2 g topically 3 (three) times a day as needed (for knee pain and back pain)    Chronic right-sided thoracic back pain  -     XR spine thoracic 3 vw; Future  -     gabapentin (NEURONTIN) 300 mg capsule; Take 1 caps PO QHS x 1 week, then 1 caps PO BID  -     Diclofenac Sodium (VOLTAREN) 1 %; Apply 2 g topically 3 (three) times a day as needed (for knee pain and back pain)    Depression, unspecified depression type  -     DULoxetine (CYMBALTA) 30 mg delayed release capsule; Take 1 capsule (30 mg total) by mouth daily For depression      61-year-old female presenting today for discussion of chronic pain in her legs as well as her back as described in HPI  I will plan to update x-rays of her lumbar spine but also include thoracic since there is significant scoliosis to the right noted and poor posture  I will also update bilateral knee x-rays as I do suspect arthritis especially on the right side  Patient reportedly did see Orthopedic or rheumatologist in the past for her knee and leg pain but was lost to follow-up  I emphasized to her the importance of continued follow-up with specialists for her medical conditions for continuity of care and to help improve her condition    I explained to her that it does become very difficult when patients do not follow up and often times if a lot of time has lapsed from last visit and workup we need to start over  She expresses understanding of this  Patient requesting refills of ibuprofen which I will not refill for her today if she is also complaining of some abdominal discomfort after taking the ibuprofen  I will address the GI symptoms in more detail if needed however I did advised against NSAIDs at this time especially since she needs to take it every day on multiple occasions  She was on Cymbalta and gabapentin in the past   I will restart gabapentin at 300 mg q h s  X1 week then increase to b i d   Most likely will need to increase to t i d  At next follow-up  Also will restart Cymbalta which appears to be primarily prescribed for depression  Will start her on 30 mg once a day and most likely will need to increase to 60 mg at next visit  Also for pain control I will trial topical Voltaren to her knees and low back as to avoid oral NSAIDs  I am uncertain if this will be covered by insurance  If not can consider Celebrex 200 mg once a day  Tylenol does not help patient  Most likely patient will need PT referral for her back, possibly comprehensive spine for PT and pain management workup may be necessary  Also will highly consider orthopedic referral for her knees  Will plan to follow-up with patient in 4 weeks to reassess her pains, review x-ray results and discuss further treatment plan  She is to get the x-rays done in advance  Chief Complaint   Patient presents with    Follow-up     back pain and leg pain ,she is taking ibuprofen and feel pain in her stomach       Subjective:     Patient ID: Ryan Egan is a 48 y o  female     57y/o female here today for discussion of low back pain and leg pain  Pt requesting more ibuprofen  [facility] TELEPHONE  USED FOR TODAYS VISIT  Pt states she gets pain in her knees B/L, right side hurts worse then left  Also pain in right ankle   States she saw ortho or rheumatologist (cannot remember) and had an injection  The pain in knees is every day  She gets pain and stiffness in her knees In  The AM, but pain is worse at night before bed  States was told she has arthritis ad her right knee was damaged  Stopped following with them b/c of covid and she lost insurance  She states her back pain has been for more than year  Lumbar xray from 2018:   "Findings-  There is mild levoscoliosis and lower lumbar facet sclerosis  There is discogenic disease involving L2-3 to the L4-5 level  No compression  fracture, dislocation, lytic lesion or spondylolysis  There may be minimal anterolisthesis at the L4-5 level No erosions of the SI  Joints "    States she didn't have any treatment, just pain relievers  She currently is using OTC pain cream and taking ibuprofen  Pain located middle of the lumbar spine  Denies radiation of pain  States used to take cymbalta and gabapentin but ran out  Cymbalta originally prescribed for depression according to past records  States was given gabapentin for pain and the one who gave the injection  She states she feels she still needs the cymbalta  States a year ago lost her son and put her in a depressed state  She states she saw a MH once  She reports beign off cymbalta and gabapentin x 1 year  According to notes in 2019 from [facility], used to see ortho for knee pain and also to establish with PT  States she does not work due to pain  When she goes to the store her RLE gets numb  She generally stays home, states she is "always doing something in the house "    She is asking for more ibuprofen but also c/o of some stomach pain to the nurse today  Review of Systems   Constitutional: Negative  Gastrointestinal: Positive for abdominal pain (epigastric w/ taking ibuprofen)  Musculoskeletal:        As in HPI   Skin: Negative  Neurological: Negative            The following portions of the patient's history were reviewed and updated as appropriate: allergies, current medications, past family history, past medical history, past social history, past surgical history and problem list       Objective:     Physical Exam  Vitals signs reviewed  Constitutional:       General: She is not in acute distress  Appearance: She is obese  She is not ill-appearing or toxic-appearing  Cardiovascular:      Rate and Rhythm: Normal rate and regular rhythm  Heart sounds: Normal heart sounds  Pulmonary:      Effort: Pulmonary effort is normal    Musculoskeletal:      Comments: B/L knees appearing normal without any obvious bony abnormality, redness, swelling or ecchymosis  She does have tenderness to palpation mainly on the right side anterior and medial   There is significant palpable crepitus with passive flexion and extension of the right knee, none palpated in the left  No significant knee instability on gross exam     Patient has tenderness to palpation along the entire thoracic and lumbar spine as well as associated paraspinals into bilateral lateral hip joints  There is some prominent  scoliosis noted to the right in the thoracolumbar region  She has relatively full range of motion of her lumbar spine but with general thoracolumbar discomfort in all directions  She has normal strength in bilateral lower extremity  Lymphadenopathy:      Head:      Right side of head: No submandibular or tonsillar adenopathy  Left side of head: No submandibular or tonsillar adenopathy  Neurological:      Mental Status: She is alert and oriented to person, place, and time  Psychiatric:         Mood and Affect: Mood normal          Speech: Speech normal          Behavior: Behavior normal  Behavior is cooperative           Vitals:    03/11/21 0858   BP: 158/88   BP Location: Left arm   Patient Position: Sitting   Cuff Size: Large   Pulse: 65   Temp: 98 6 °F (37 °C)   TempSrc: Temporal   SpO2: 99%   Weight: 95 1 kg (209 lb 9 6 oz)   Height: 5' 5" (1 651 m)

## 2021-03-30 ENCOUNTER — HOSPITAL ENCOUNTER (OUTPATIENT)
Dept: RADIOLOGY | Facility: HOSPITAL | Age: 56
Discharge: HOME | End: 2021-03-30
Payer: COMMERCIAL

## 2021-03-30 DIAGNOSIS — M25.561 CHRONIC PAIN OF BOTH KNEES: ICD-10-CM

## 2021-03-30 DIAGNOSIS — G89.29 CHRONIC RIGHT-SIDED THORACIC BACK PAIN: ICD-10-CM

## 2021-03-30 DIAGNOSIS — M25.562 CHRONIC PAIN OF BOTH KNEES: ICD-10-CM

## 2021-03-30 DIAGNOSIS — G89.29 CHRONIC PAIN OF BOTH KNEES: ICD-10-CM

## 2021-03-30 DIAGNOSIS — G89.29 CHRONIC BILATERAL LOW BACK PAIN WITHOUT SCIATICA: ICD-10-CM

## 2021-03-30 DIAGNOSIS — M54.6 CHRONIC RIGHT-SIDED THORACIC BACK PAIN: ICD-10-CM

## 2021-03-30 DIAGNOSIS — M54.50 CHRONIC BILATERAL LOW BACK PAIN WITHOUT SCIATICA: ICD-10-CM

## 2021-03-30 PROCEDURE — 72110 X-RAY EXAM L-2 SPINE 4/>VWS: CPT

## 2021-03-30 PROCEDURE — 72072 X-RAY EXAM THORAC SPINE 3VWS: CPT

## 2021-03-30 PROCEDURE — 73562 X-RAY EXAM OF KNEE 3: CPT

## 2021-04-08 ENCOUNTER — OFFICE VISIT (OUTPATIENT)
Dept: INTERNAL MEDICINE CLINIC | Facility: CLINIC | Age: 56
End: 2021-04-08

## 2021-04-08 VITALS
HEART RATE: 87 BPM | BODY MASS INDEX: 33.95 KG/M2 | TEMPERATURE: 97.2 F | OXYGEN SATURATION: 98 % | DIASTOLIC BLOOD PRESSURE: 87 MMHG | SYSTOLIC BLOOD PRESSURE: 142 MMHG | WEIGHT: 204 LBS

## 2021-04-08 DIAGNOSIS — I10 ESSENTIAL HYPERTENSION: ICD-10-CM

## 2021-04-08 DIAGNOSIS — Z12.11 ENCOUNTER FOR SCREENING COLONOSCOPY: ICD-10-CM

## 2021-04-08 DIAGNOSIS — E11.9 CONTROLLED TYPE 2 DIABETES MELLITUS WITHOUT COMPLICATION, WITHOUT LONG-TERM CURRENT USE OF INSULIN (HCC): ICD-10-CM

## 2021-04-08 DIAGNOSIS — M54.6 CHRONIC RIGHT-SIDED THORACIC BACK PAIN: ICD-10-CM

## 2021-04-08 DIAGNOSIS — G89.29 CHRONIC PAIN OF BOTH SHOULDERS: ICD-10-CM

## 2021-04-08 DIAGNOSIS — M79.10 MYALGIA: ICD-10-CM

## 2021-04-08 DIAGNOSIS — M25.511 CHRONIC PAIN OF BOTH SHOULDERS: ICD-10-CM

## 2021-04-08 DIAGNOSIS — Z12.31 ENCOUNTER FOR SCREENING MAMMOGRAM FOR BREAST CANCER: ICD-10-CM

## 2021-04-08 DIAGNOSIS — G89.29 CHRONIC PAIN OF BOTH KNEES: Primary | ICD-10-CM

## 2021-04-08 DIAGNOSIS — G89.29 CHRONIC BILATERAL LOW BACK PAIN WITHOUT SCIATICA: ICD-10-CM

## 2021-04-08 DIAGNOSIS — Z12.11 SCREEN FOR COLON CANCER: ICD-10-CM

## 2021-04-08 DIAGNOSIS — M25.562 CHRONIC PAIN OF BOTH KNEES: Primary | ICD-10-CM

## 2021-04-08 DIAGNOSIS — F32.A DEPRESSION, UNSPECIFIED DEPRESSION TYPE: ICD-10-CM

## 2021-04-08 DIAGNOSIS — M54.50 CHRONIC BILATERAL LOW BACK PAIN WITHOUT SCIATICA: ICD-10-CM

## 2021-04-08 DIAGNOSIS — G89.29 CHRONIC RIGHT-SIDED THORACIC BACK PAIN: ICD-10-CM

## 2021-04-08 DIAGNOSIS — E66.09 CLASS 1 OBESITY DUE TO EXCESS CALORIES WITH SERIOUS COMORBIDITY AND BODY MASS INDEX (BMI) OF 33.0 TO 33.9 IN ADULT: ICD-10-CM

## 2021-04-08 DIAGNOSIS — M25.50 CHRONIC JOINT PAIN: ICD-10-CM

## 2021-04-08 DIAGNOSIS — M25.561 CHRONIC PAIN OF BOTH KNEES: Primary | ICD-10-CM

## 2021-04-08 DIAGNOSIS — M25.512 CHRONIC PAIN OF BOTH SHOULDERS: ICD-10-CM

## 2021-04-08 DIAGNOSIS — G89.29 CHRONIC JOINT PAIN: ICD-10-CM

## 2021-04-08 PROCEDURE — 3079F DIAST BP 80-89 MM HG: CPT | Performed by: PHYSICIAN ASSISTANT

## 2021-04-08 PROCEDURE — 1036F TOBACCO NON-USER: CPT | Performed by: PHYSICIAN ASSISTANT

## 2021-04-08 PROCEDURE — 3077F SYST BP >= 140 MM HG: CPT | Performed by: PHYSICIAN ASSISTANT

## 2021-04-08 PROCEDURE — 99214 OFFICE O/P EST MOD 30 MIN: CPT | Performed by: PHYSICIAN ASSISTANT

## 2021-04-08 RX ORDER — DULOXETIN HYDROCHLORIDE 60 MG/1
60 CAPSULE, DELAYED RELEASE ORAL DAILY
Qty: 90 CAPSULE | Refills: 1
Start: 2021-04-08

## 2021-04-08 RX ORDER — DULOXETIN HYDROCHLORIDE 60 MG/1
60 CAPSULE, DELAYED RELEASE ORAL DAILY
Qty: 90 CAPSULE | Refills: 1 | Status: SHIPPED | OUTPATIENT
Start: 2021-04-08 | End: 2021-04-08 | Stop reason: SDUPTHER

## 2021-04-08 RX ORDER — MELOXICAM 15 MG/1
15 TABLET ORAL
Qty: 90 TABLET | Refills: 1 | Status: SHIPPED | OUTPATIENT
Start: 2021-04-08 | End: 2021-05-28

## 2021-04-08 RX ORDER — GABAPENTIN 300 MG/1
CAPSULE ORAL
Qty: 120 CAPSULE | Refills: 3 | Status: SHIPPED | OUTPATIENT
Start: 2021-04-08

## 2021-04-08 NOTE — PROGRESS NOTES
Assessment/Plan:      Diagnoses and all orders for this visit:    Chronic pain of both knees  -     Ambulatory referral to Orthopedic Surgery; Future  -     SADIA Screen w/ Reflex to Titer/Pattern; Future  -     C-reactive protein; Future  -     RF Screen w/ Reflex to Titer; Future  -     Lyme Antibody Profile with reflex to WB; Future  -     Cyclic citrul peptide antibody, IgG; Future  -     gabapentin (NEURONTIN) 300 mg capsule; Take 1 caps PO in AM, 1 caps PO in afternoon  And 2 caps PO QHS  -     meloxicam (MOBIC) 15 mg tablet; Take 1 tablet (15 mg total) by mouth daily with breakfast    Chronic bilateral low back pain without sciatica  -     Ambulatory Referral to [facility]; Future  -     SADIA Screen w/ Reflex to Titer/Pattern; Future  -     C-reactive protein; Future  -     RF Screen w/ Reflex to Titer; Future  -     Lyme Antibody Profile with reflex to WB; Future  -     Cyclic citrul peptide antibody, IgG; Future  -     gabapentin (NEURONTIN) 300 mg capsule; Take 1 caps PO in AM, 1 caps PO in afternoon  And 2 caps PO QHS  -     meloxicam (MOBIC) 15 mg tablet; Take 1 tablet (15 mg total) by mouth daily with breakfast    Chronic right-sided thoracic back pain  -     Ambulatory Referral to [facility]; Future  -     gabapentin (NEURONTIN) 300 mg capsule; Take 1 caps PO in AM, 1 caps PO in afternoon  And 2 caps PO QHS  -     meloxicam (MOBIC) 15 mg tablet; Take 1 tablet (15 mg total) by mouth daily with breakfast    Controlled type 2 diabetes mellitus without complication, without long-term current use of insulin (HCC)  -     Comprehensive metabolic panel; Future  -     Lipid panel; Future  -     Hemoglobin A1C; Future    Essential hypertension  -     CBC and differential; Future  -     Comprehensive metabolic panel;  Future    Class 1 obesity due to excess calories with serious comorbidity and body mass index (BMI) of 33 0 to 33 9 in adult  -     CBC and differential; Future  - Comprehensive metabolic panel; Future  -     Lipid panel; Future  -     Hemoglobin A1C; Future    Chronic pain of both shoulders  -     Ambulatory referral to Orthopedic Surgery; Future  -     Ambulatory Referral to [facility]; Future  -     SADIA Screen w/ Reflex to Titer/Pattern; Future  -     C-reactive protein; Future  -     RF Screen w/ Reflex to Titer; Future  -     Lyme Antibody Profile with reflex to WB; Future  -     Cyclic citrul peptide antibody, IgG; Future  -     meloxicam (MOBIC) 15 mg tablet; Take 1 tablet (15 mg total) by mouth daily with breakfast    Chronic joint pain  -     SADIA Screen w/ Reflex to Titer/Pattern; Future  -     C-reactive protein; Future  -     RF Screen w/ Reflex to Titer; Future  -     Lyme Antibody Profile with reflex to WB; Future  -     Cyclic citrul peptide antibody, IgG; Future  -     DULoxetine (CYMBALTA) 60 mg delayed release capsule; Take 1 capsule (60 mg total) by mouth daily For depression    Myalgia  -     SADIA Screen w/ Reflex to Titer/Pattern; Future  -     C-reactive protein; Future  -     RF Screen w/ Reflex to Titer; Future  -     Lyme Antibody Profile with reflex to WB; Future  -     Cyclic citrul peptide antibody, IgG; Future  -     DULoxetine (CYMBALTA) 60 mg delayed release capsule; Take 1 capsule (60 mg total) by mouth daily For depression    Depression, unspecified depression type  -     Discontinue: DULoxetine (CYMBALTA) 60 mg delayed release capsule; Take 1 capsule (60 mg total) by mouth daily For depression  -     DULoxetine (CYMBALTA) 60 mg delayed release capsule; Take 1 capsule (60 mg total) by mouth daily For depression    Encounter for screening mammogram for breast cancer  -     Mammo screening bilateral w cad; Future    Screen for colon cancer  -     Ambulatory referral to Obstetrics / Gynecology; Future    Encounter for screening colonoscopy  -     Ambulatory referral to Gastroenterology;  Future       Patient is a 60-year-old female presenting today at my request to follow-up for multiple pain complaints and review x-ray results as described in HPI  In addition to her chronic knee pain, thoracic and lumbar pain she also reports bilateral shoulder pain x2 weeks which has been had intermittent ongoing issue as well  Unfortunately patient seems to complain of chronic widespread pain in multiple joints and muscles making it difficult to tell if these are all separate issues or if these could be all related secondary to chronic disease  At this time to focus on her back I will refer her to comprehensive spine and pain to consider workup with pain management and physical therapy in conjunction with her medications to improve her pain  I will also refer to orthopedics a 2nd opinion for her shoulder and knee pain  For better control of her chronic pain I will discontinue diclofenac which I do not believe patient was using anyway  I will start her on oral meloxicam once a day with food, I will increase Cymbalta to 60 mg daily and I will increase her gabapentin from 1 capsule b i d  to  1 capsule in the morning, 1 capsule in the afternoon and 2 capsules before bed  I have several concerns with patient today and the extent of her pains  I will send her for autoimmune blood work testing to include SADIA, rheumatoid factor, CRP, Lyme and CCP  She is to get this done at her earliest convenience and we will call her with results  She does have a very prominent hump at the base of her cervical spine in the start of her thoracic spine  It is difficult to tell if this could be a sign of Cushing's or if this is more prominent secondary to her kyphosis evident on her thoracic spine   X-ray  I may need to consider further endocrine workup as well considering she does have hypertension and diabetes  May need cortisols levels checked but will address this in further detail at next follow-up in 2-3 months      Patient does need routine follow-up with me in June or July  I did give her routine labs to get done after June 13, 6 months from the last time her labs were completed and will have her follow-up with me in early July only to discuss her chronic conditions, review her blood work results and update her foot exam as well as discuss eye exam       Today I did provide her with order to schedule  her mammogram as well as referral to gynecologist and Gastroenterology  To consult for screening colonoscopy  Chief Complaint   Patient presents with    Follow-up     Shoulder ,knee,back pain ,patient said the she can't walk or stand for long periods of time       Subjective:     Patient ID: Claribel Perkins is a 48 y o  female     55y/o female here today for f/u for multiple pains and to review her knee and spine xray results  She continues with chronic pain in her back and knees and also c/o shoulder pain today, which we have not addressed in detail before  states difficult and painful to walk  Lumbar xray : "Mild disc space narrowing is seen at the L3-L4 and L4-L5 levels  Facet degeneration is noted extending from L4 through S1 "  Thoracic xray : "Again noted are post medical change to the anterior aspect of T10  Thoracic kyphosis again seen  Mild diffuse degenerative change seen throughout the thoracic spine "  Left knee: normal  Right knee: "Mild joint space narrowing and marginal osteophyte formation is seen within the medial and patellofemoral compartments  Lateral compartment appears preserved with minimal marginal osteophytes present "    She also c/o B/l shoulder pain B/L but right side hurts more  She states she has had shoulder pain before but would come and go  She denies any injury or strenuous activity to cause the pain  She states she feels the pain mainly when laying down  The right shoulder hurts when lifting something       Ongoing B/l low back pain, no radiation into  LE's   Also B/L thoracic pain, curvature/exaggerated kyphosis/buffalo hump, pain worse in right  Pt notes pain in right thigh laterally with the right knee pain  She only has been taking the medications and using Icey hot, no stretches at home  She stopped working  She is very sedentary at home b/c of pain  [employer] telephone  used for todays visit           Review of Systems   Constitutional: Negative  Musculoskeletal:        Multiple pain complaints As in Hpi   Skin: Negative  Neurological: Negative  The following portions of the patient's history were reviewed and updated as appropriate: allergies, current medications, past family history, past medical history, past social history, past surgical history and problem list       Objective:     Physical Exam  Vitals signs reviewed  Constitutional:       General: She is not in acute distress  Appearance: She is obese  She is not ill-appearing or toxic-appearing  HENT:      Head: Normocephalic and atraumatic  Neck:      Musculoskeletal: Decreased range of motion  Pain with movement, spinous process tenderness and muscular tenderness present  Comments: Prominent buffalo hump-type deformity lower neck/upper throacic   Cardiovascular:      Rate and Rhythm: Normal rate and regular rhythm  Pulmonary:      Effort: Pulmonary effort is normal    Musculoskeletal:      Comments:   Prominent buffalo hump type deformity as above with some exaggerated kyphosis in the upper thoracic spine, slightly more prominent on right side than left, noted on the x-ray  She has tenderness to palpation of bilateral thoracic and lumbar paraspinals with more tenderness to palpation on the right compared to the left  She has significant decreased range of motion of the thoracolumbar spine especially with forward flexion and extension but pain with all range of motion testing including bilateral lateral flexion and bilateral rotation        Patient has tenderness to palpation in bilateral shoulders primarily lateral joints in to the upper lateral bicep region as well, pain is symmetrical   There  Is no obvious deformity, swelling or discoloration of the shoulder joints  She has  Slight limited range of motion raising arms above head at the shoulder joint and is able to move with decent range of motion in all other directions but is very slow and guarding  No obvious weakness noted in the upper or lower extremities  Neurological:      Mental Status: She is alert  Sensory: No sensory deficit  Motor: Motor function is intact  Coordination: Coordination is intact  Gait: Gait abnormal (  Slow but steady gait, guarding with back and knee pain)  Psychiatric:         Mood and Affect: Mood is depressed  Affect is flat           Vitals:    04/08/21 0924   BP: 142/87   BP Location: Left arm   Patient Position: Sitting   Cuff Size: Standard   Pulse: 87   Temp: (!) 97 2 °F (36 2 °C)   TempSrc: Temporal   SpO2: 98%   Weight: 92 5 kg (204 lb)

## 2021-04-09 ENCOUNTER — TELEPHONE (OUTPATIENT)
Dept: PHYSICAL THERAPY | Facility: OTHER | Age: 56
End: 2021-04-09

## 2021-04-09 NOTE — TELEPHONE ENCOUNTER
Call placed to the patient per [facility] referral     Call answered an immediately disconnected x2  This is the 1st attempt to reach the patient  Will defer per protocol

## 2021-04-13 ENCOUNTER — TELEPHONE (OUTPATIENT)
Dept: PHYSICAL THERAPY | Facility: OTHER | Age: 56
End: 2021-04-13

## 2021-04-13 NOTE — TELEPHONE ENCOUNTER
S I  # [id number]    Call placed to the patient per [facility] referral     Voice message left for patient to call back  Phone number and hours of business provided  This the 2nd attempt to reach the patient  Will defer per protocol

## 2021-04-20 ENCOUNTER — TELEPHONE (OUTPATIENT)
Dept: PHYSICAL THERAPY | Facility: OTHER | Age: 56
End: 2021-04-20

## 2021-04-20 NOTE — TELEPHONE ENCOUNTER
Nurse reached out to discuss recent referral entered for [facility] and offerings  Messages left for pt in [address]  Patient answered phone today and disconnected call after nurse identified self      Referral closed per protocol

## 2021-05-18 ENCOUNTER — TELEPHONE (OUTPATIENT)
Dept: INTERNAL MEDICINE CLINIC | Facility: CLINIC | Age: 56
End: 2021-05-18

## 2021-05-18 DIAGNOSIS — Z11.1 SCREENING FOR TUBERCULOSIS: Primary | ICD-10-CM

## 2021-05-18 NOTE — TELEPHONE ENCOUNTER
Patient called requesting a TB done for employer  Patient has a form to be completed  The form is just requesting record of TB done  No need for a physical- no due date either on form   Please placed order & I will call to schedule nurse visit once the order is received

## 2021-05-19 ENCOUNTER — CLINICAL SUPPORT (OUTPATIENT)
Dept: INTERNAL MEDICINE CLINIC | Facility: CLINIC | Age: 56
End: 2021-05-19

## 2021-05-19 ENCOUNTER — TELEPHONE (OUTPATIENT)
Dept: INTERNAL MEDICINE CLINIC | Facility: CLINIC | Age: 56
End: 2021-05-19

## 2021-05-19 DIAGNOSIS — Z11.1 SCREENING FOR TUBERCULOSIS: Primary | ICD-10-CM

## 2021-05-19 PROCEDURE — 86580 TB INTRADERMAL TEST: CPT | Performed by: INTERNAL MEDICINE

## 2021-05-19 NOTE — PROGRESS NOTES
Patient seen today on nurse schedule fir 1st step PPD for employer  Placed PPD in left lower forearm  Patient tolerated well  Patient instructed to return Friday after 11:15 to have read

## 2021-05-19 NOTE — TELEPHONE ENCOUNTER
Patient is here today for PPD placement  PPD placed in left forearm  Patient does not have paperwork to be completed  TB test was placed on 5/19/21 at 11:15am      Patient is aware to return for TB test reading on 5/21/21 after 11:15am      Patient needs appointment for 2nd step PPD per patients employer  Patient was provided a TB appointment reminder with this information

## 2021-05-24 DIAGNOSIS — Z11.1 SCREENING FOR TUBERCULOSIS: Primary | ICD-10-CM

## 2021-05-24 LAB
INDURATION: NORMAL MM
TB SKIN TEST: NORMAL

## 2021-05-24 NOTE — PROGRESS NOTES
Patient came into the office for PPD reading on 5/21/21 which showed an 8mm induration that was double checked by Dr George Raza   Order placed for [facility]

## 2021-05-24 NOTE — TELEPHONE ENCOUNTER
Late entry    Patient came into the office on 5/21/21 to have PPD read  She had an 8mm induration with redness  This was double checked by Dr Shaila Shrestha and it was decided that patient would go for [employer] testing  Patient stated she had a BCG vaccine as a child   Will await those results

## 2021-05-25 ENCOUNTER — APPOINTMENT (OUTPATIENT)
Dept: LAB | Facility: CLINIC | Age: 56
End: 2021-05-25
Payer: COMMERCIAL

## 2021-05-25 DIAGNOSIS — M25.511 CHRONIC PAIN OF BOTH SHOULDERS: ICD-10-CM

## 2021-05-25 DIAGNOSIS — M25.50 CHRONIC JOINT PAIN: ICD-10-CM

## 2021-05-25 DIAGNOSIS — Z11.1 SCREENING FOR TUBERCULOSIS: ICD-10-CM

## 2021-05-25 DIAGNOSIS — G89.29 CHRONIC JOINT PAIN: ICD-10-CM

## 2021-05-25 DIAGNOSIS — G89.29 CHRONIC BILATERAL LOW BACK PAIN WITHOUT SCIATICA: ICD-10-CM

## 2021-05-25 DIAGNOSIS — G89.29 CHRONIC PAIN OF BOTH KNEES: ICD-10-CM

## 2021-05-25 DIAGNOSIS — M25.561 CHRONIC PAIN OF BOTH KNEES: ICD-10-CM

## 2021-05-25 DIAGNOSIS — G89.29 CHRONIC PAIN OF BOTH SHOULDERS: ICD-10-CM

## 2021-05-25 DIAGNOSIS — M79.10 MYALGIA: ICD-10-CM

## 2021-05-25 DIAGNOSIS — M25.562 CHRONIC PAIN OF BOTH KNEES: ICD-10-CM

## 2021-05-25 DIAGNOSIS — M54.50 CHRONIC BILATERAL LOW BACK PAIN WITHOUT SCIATICA: ICD-10-CM

## 2021-05-25 DIAGNOSIS — M25.512 CHRONIC PAIN OF BOTH SHOULDERS: ICD-10-CM

## 2021-05-25 LAB — CRP SERPL QL: 7.8 MG/L

## 2021-05-25 PROCEDURE — 86140 C-REACTIVE PROTEIN: CPT

## 2021-05-25 PROCEDURE — 86618 LYME DISEASE ANTIBODY: CPT

## 2021-05-25 PROCEDURE — 86200 CCP ANTIBODY: CPT

## 2021-05-25 PROCEDURE — 86430 RHEUMATOID FACTOR TEST QUAL: CPT

## 2021-05-25 PROCEDURE — 86480 TB TEST CELL IMMUN MEASURE: CPT

## 2021-05-25 PROCEDURE — 86038 ANTINUCLEAR ANTIBODIES: CPT

## 2021-05-25 PROCEDURE — 36415 COLL VENOUS BLD VENIPUNCTURE: CPT

## 2021-05-26 DIAGNOSIS — E11.9 CONTROLLED TYPE 2 DIABETES MELLITUS WITHOUT COMPLICATION, WITHOUT LONG-TERM CURRENT USE OF INSULIN (HCC): ICD-10-CM

## 2021-05-26 LAB
B BURGDOR IGG+IGM SER-ACNC: 117
CCP AB SER IA-ACNC: 1.2
RHEUMATOID FACT SER QL LA: NEGATIVE
RYE IGE QN: NEGATIVE

## 2021-05-27 LAB
GAMMA INTERFERON BACKGROUND BLD IA-ACNC: 0.03 IU/ML
M TB IFN-G BLD-IMP: NEGATIVE
M TB IFN-G CD4+ BCKGRND COR BLD-ACNC: 0 IU/ML
M TB IFN-G CD4+ BCKGRND COR BLD-ACNC: 0 IU/ML
MITOGEN IGNF BCKGRD COR BLD-ACNC: >10 IU/ML

## 2021-05-27 NOTE — TELEPHONE ENCOUNTER
Called patient, patient made aware of negative results  Per patient she will be coming in tomorrow morning to  results  Results printed and placed in [employer] folder

## 2021-05-28 ENCOUNTER — TELEPHONE (OUTPATIENT)
Dept: INTERNAL MEDICINE CLINIC | Facility: CLINIC | Age: 56
End: 2021-05-28

## 2021-05-28 DIAGNOSIS — M25.562 CHRONIC PAIN OF BOTH KNEES: ICD-10-CM

## 2021-05-28 DIAGNOSIS — G89.29 CHRONIC PAIN OF BOTH KNEES: ICD-10-CM

## 2021-05-28 DIAGNOSIS — M25.561 CHRONIC PAIN OF BOTH KNEES: ICD-10-CM

## 2021-05-28 DIAGNOSIS — M17.11 PRIMARY OSTEOARTHRITIS OF RIGHT KNEE: Primary | ICD-10-CM

## 2021-05-28 RX ORDER — IBUPROFEN 800 MG/1
800 TABLET ORAL EVERY 8 HOURS PRN
Qty: 30 TABLET | Refills: 2 | Status: SHIPPED | OUTPATIENT
Start: 2021-05-28

## 2021-05-28 NOTE — TELEPHONE ENCOUNTER
Patient called office requesting ibuprofen 800mg  Referred to encounter from 3/5/2021 and patient stated she is not taking meloxicam  Patient would like refill for ibuprofen 800mg to be sent to the pharmacy listed in her chart   Please review

## 2021-07-08 ENCOUNTER — TELEPHONE (OUTPATIENT)
Dept: INTERNAL MEDICINE CLINIC | Facility: CLINIC | Age: 56
End: 2021-07-08

## 2022-10-12 ENCOUNTER — TELEPHONE (OUTPATIENT)
Dept: INTERNAL MEDICINE CLINIC | Facility: CLINIC | Age: 57
End: 2022-10-12